# Patient Record
Sex: MALE | Race: WHITE | NOT HISPANIC OR LATINO | Employment: FULL TIME | URBAN - METROPOLITAN AREA
[De-identification: names, ages, dates, MRNs, and addresses within clinical notes are randomized per-mention and may not be internally consistent; named-entity substitution may affect disease eponyms.]

---

## 2017-10-12 ENCOUNTER — APPOINTMENT (OUTPATIENT)
Dept: GENERAL RADIOLOGY | Facility: HOSPITAL | Age: 22
End: 2017-10-12

## 2017-10-12 ENCOUNTER — HOSPITAL ENCOUNTER (EMERGENCY)
Facility: HOSPITAL | Age: 22
Discharge: HOME | End: 2017-10-12
Attending: EMERGENCY MEDICINE | Admitting: EMERGENCY MEDICINE

## 2017-10-12 VITALS
HEART RATE: 64 BPM | OXYGEN SATURATION: 100 % | SYSTOLIC BLOOD PRESSURE: 127 MMHG | BODY MASS INDEX: 22.9 KG/M2 | WEIGHT: 160 LBS | TEMPERATURE: 98.4 F | DIASTOLIC BLOOD PRESSURE: 90 MMHG | RESPIRATION RATE: 24 BRPM | HEIGHT: 70 IN

## 2017-10-12 DIAGNOSIS — R07.89 ATYPICAL CHEST PAIN: Primary | ICD-10-CM

## 2017-10-12 LAB
ALBUMIN SERPL-MCNC: 4.7 G/DL (ref 3.5–5.2)
ALBUMIN/GLOB SERPL: 1.9 G/DL
ALP SERPL-CCNC: 54 U/L (ref 40–129)
ALT SERPL W P-5'-P-CCNC: 16 U/L (ref 5–41)
ANION GAP SERPL CALCULATED.3IONS-SCNC: 15.7 MMOL/L
AST SERPL-CCNC: 18 U/L (ref 5–40)
BASOPHILS # BLD AUTO: 0.07 10*3/MM3 (ref 0–0.2)
BASOPHILS NFR BLD AUTO: 0.6 % (ref 0–2)
BILIRUB SERPL-MCNC: 0.9 MG/DL (ref 0.2–1.2)
BUN BLD-MCNC: 9 MG/DL (ref 6–20)
BUN/CREAT SERPL: 8 (ref 7–25)
CALCIUM SPEC-SCNC: 10 MG/DL (ref 8.6–10.5)
CHLORIDE SERPL-SCNC: 99 MMOL/L (ref 98–107)
CO2 SERPL-SCNC: 23.3 MMOL/L (ref 22–29)
CREAT BLD-MCNC: 1.12 MG/DL (ref 0.76–1.27)
D DIMER PPP FEU-MCNC: <0.3 MCGFEU/ML (ref 0–0.46)
DEPRECATED RDW RBC AUTO: 38.9 FL (ref 37–54)
EOSINOPHIL # BLD AUTO: 0.11 10*3/MM3 (ref 0.1–0.3)
EOSINOPHIL NFR BLD AUTO: 1 % (ref 0–4)
ERYTHROCYTE [DISTWIDTH] IN BLOOD BY AUTOMATED COUNT: 12.5 % (ref 11.5–14.5)
GFR SERPL CREATININE-BSD FRML MDRD: 81 ML/MIN/1.73
GLOBULIN UR ELPH-MCNC: 2.5 GM/DL
GLUCOSE BLD-MCNC: 104 MG/DL (ref 65–99)
HCT VFR BLD AUTO: 45.5 % (ref 42–52)
HGB BLD-MCNC: 16.3 G/DL (ref 14–18)
IMM GRANULOCYTES # BLD: 0.05 10*3/MM3 (ref 0–0.03)
IMM GRANULOCYTES NFR BLD: 0.4 % (ref 0–0.5)
LIPASE SERPL-CCNC: 14 U/L (ref 13–60)
LYMPHOCYTES # BLD AUTO: 2.24 10*3/MM3 (ref 0.6–4.8)
LYMPHOCYTES NFR BLD AUTO: 19.9 % (ref 20–45)
MCH RBC QN AUTO: 30.7 PG (ref 27–31)
MCHC RBC AUTO-ENTMCNC: 35.8 G/DL (ref 31–37)
MCV RBC AUTO: 85.7 FL (ref 80–94)
MONOCYTES # BLD AUTO: 0.81 10*3/MM3 (ref 0–1)
MONOCYTES NFR BLD AUTO: 7.2 % (ref 3–8)
NEUTROPHILS # BLD AUTO: 7.95 10*3/MM3 (ref 1.5–8.3)
NEUTROPHILS NFR BLD AUTO: 70.9 % (ref 45–70)
NRBC BLD MANUAL-RTO: 0 /100 WBC (ref 0–0)
PLATELET # BLD AUTO: 269 10*3/MM3 (ref 140–500)
PMV BLD AUTO: 11.7 FL (ref 7.4–10.4)
POTASSIUM BLD-SCNC: 3.8 MMOL/L (ref 3.5–5.2)
PROT SERPL-MCNC: 7.2 G/DL (ref 6–8.5)
RBC # BLD AUTO: 5.31 10*6/MM3 (ref 4.7–6.1)
SODIUM BLD-SCNC: 138 MMOL/L (ref 136–145)
TROPONIN T SERPL-MCNC: <0.01 NG/ML (ref 0–0.03)
WBC NRBC COR # BLD: 11.23 10*3/MM3 (ref 4.8–10.8)

## 2017-10-12 PROCEDURE — 93005 ELECTROCARDIOGRAM TRACING: CPT | Performed by: EMERGENCY MEDICINE

## 2017-10-12 PROCEDURE — 99284 EMERGENCY DEPT VISIT MOD MDM: CPT | Performed by: EMERGENCY MEDICINE

## 2017-10-12 PROCEDURE — 93010 ELECTROCARDIOGRAM REPORT: CPT | Performed by: INTERNAL MEDICINE

## 2017-10-12 PROCEDURE — 80053 COMPREHEN METABOLIC PANEL: CPT | Performed by: EMERGENCY MEDICINE

## 2017-10-12 PROCEDURE — 83690 ASSAY OF LIPASE: CPT | Performed by: EMERGENCY MEDICINE

## 2017-10-12 PROCEDURE — 85025 COMPLETE CBC W/AUTO DIFF WBC: CPT | Performed by: EMERGENCY MEDICINE

## 2017-10-12 PROCEDURE — 84484 ASSAY OF TROPONIN QUANT: CPT | Performed by: EMERGENCY MEDICINE

## 2017-10-12 PROCEDURE — 71010 HC CHEST PA OR AP: CPT

## 2017-10-12 PROCEDURE — 99284 EMERGENCY DEPT VISIT MOD MDM: CPT

## 2017-10-12 PROCEDURE — 85379 FIBRIN DEGRADATION QUANT: CPT | Performed by: EMERGENCY MEDICINE

## 2017-10-12 NOTE — ED PROVIDER NOTES
"Subjective     History provided by:  Patient    History of Present Illness    · Chief complaint: Chest pain    · Location: Left chest just below the nipple that does not radiate    · Quality/Severity: Pain is described as burning and comes and goes.    · Timing/Onset: The pain started 2 weeks ago and is been intermittent    · Modifying Factors: Taking a deep breath will exacerbate the pain sometimes    · Associated symptoms: He reports being a little short of breath, having nausea and vomiting today,  episodes of passing out, and cold sweats.    · Narrative: The patient is a 22-year-old white male complaining of a two-week history of intermittent left lower chest pain that does not radiate.  He reports associated nausea and vomiting today.  Today's episode is been present for 2 hours.  The patient wonders if is due to \"stress\".  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  Does not use drugs or alcohol, does drink energy drinks\".  He does not know his father.    ED Triage Vitals   Temp Heart Rate Resp BP SpO2   10/12/17 1218 10/12/17 1218 10/12/17 1218 10/12/17 1218 10/12/17 1218   97.9 °F (36.6 °C) 84 22 114/73 99 %      Temp src Heart Rate Source Patient Position BP Location FiO2 (%)   10/12/17 1218 10/12/17 1218 10/12/17 1218 10/12/17 1218 --   Oral Monitor Lying Left arm        Review of Systems   Constitutional: Positive for diaphoresis. Negative for activity change, appetite change, chills, fatigue and fever.   HENT: Negative for congestion, dental problem, ear pain, hearing loss, mouth sores, postnasal drip, rhinorrhea, sinus pressure, sore throat and voice change.    Eyes: Negative for photophobia, pain, discharge, redness and visual disturbance.   Respiratory: " Positive for shortness of breath. Negative for cough, chest tightness, wheezing and stridor.    Cardiovascular: Positive for chest pain. Negative for palpitations and leg swelling.   Gastrointestinal: Positive for nausea and vomiting. Negative for abdominal pain and diarrhea.   Genitourinary: Negative for difficulty urinating, dysuria, flank pain, frequency, hematuria and urgency.   Musculoskeletal: Negative for arthralgias, back pain, gait problem, joint swelling, myalgias, neck pain and neck stiffness.   Skin: Negative for color change and rash.   Neurological: Positive for syncope. Negative for dizziness, tremors, seizures, facial asymmetry, speech difficulty, weakness, light-headedness, numbness and headaches.   Hematological: Negative for adenopathy.   Psychiatric/Behavioral: Negative for confusion and decreased concentration. The patient is nervous/anxious.        Past Medical History:   Diagnosis Date   • Anxiety    • Partial hearing loss, right        No Known Allergies    Past Surgical History:   Procedure Laterality Date   • EAR CANALOPLASTY     • TONSILLECTOMY AND ADENOIDECTOMY         Family History   Problem Relation Age of Onset   • Irritable bowel syndrome Mother    • Cirrhosis Father        Social History     Social History   • Marital status: Single     Spouse name: N/A   • Number of children: N/A   • Years of education: N/A     Social History Main Topics   • Smoking status: Heavy Tobacco Smoker     Packs/day: 1.50     Types: Cigarettes   • Smokeless tobacco: None   • Alcohol use No   • Drug use: No   • Sexual activity: Defer      Comment: EXERCISE - RARELY     Other Topics Concern   • None     Social History Narrative   • None           Objective   Physical Exam   Constitutional: He is oriented to person, place, and time. He appears well-developed and well-nourished. No distress.   HENT:   Head: Normocephalic and atraumatic.   Nose: Nose normal.   Mouth/Throat: Oropharynx is clear and moist. No  oropharyngeal exudate.   Eyes: EOM are normal. Pupils are equal, round, and reactive to light. Right eye exhibits no discharge. Left eye exhibits no discharge. No scleral icterus.   Neck: Normal range of motion. Neck supple. No JVD present. No thyromegaly present.   Cardiovascular: Normal rate, regular rhythm and normal heart sounds.    No murmur heard.  Pulmonary/Chest: Effort normal and breath sounds normal. He has no wheezes. He has no rales. He exhibits no tenderness.   Pressure applied to the patient's left chest wall reproduces the chest pain.   Abdominal: Soft. Bowel sounds are normal. He exhibits no distension. There is no tenderness.   Musculoskeletal: Normal range of motion. He exhibits no edema, tenderness or deformity.   Lymphadenopathy:     He has no cervical adenopathy.   Neurological: He is alert and oriented to person, place, and time. No cranial nerve deficit. Coordination normal.   No focal motor sensory deficit   Skin: Skin is warm and dry. No rash noted. He is not diaphoretic.   Psychiatric: He has a normal mood and affect. His behavior is normal. Judgment and thought content normal.   Nursing note and vitals reviewed.      ECG 12 Lead    Date/Time: 10/12/2017 12:23 PM  Performed by: JEANCARLOS WOLF.  Authorized by: JEANCARLOS WOLF   Comments: EKG performed 12:23, EKG read 12/20/25.  Normal sinus rhythm rate 73, normal axis, normal R-wave progression, normal conduction, no acute ST segment elevation or depression consistent with ischemia, no ectopy, normal CT and QT intervals, normal EKG               ED Course  ED Course   Comment By Time   Review the patient's laboratory studies reveal a normal troponin, normal d-dimer, normal CMP, and slightly elevated white count of 11.2 with 71 segs count.  His chest sensory revealed no cardiopulmonary abnormality.  His EKG was normal.  Is my impression is chest pain is atypical and noncardiac in etiology.  I counseled the patient to stop smoking. Jeancarlos SHAHID  MD Aleksandr 10/12 1412                  [facility]  Number of Diagnoses or Management Options  Atypical chest pain: new and requires workup     Amount and/or Complexity of Data Reviewed  Clinical lab tests: ordered and reviewed  Tests in the radiology section of CPT®: ordered and reviewed  Tests in the medicine section of CPT®: ordered and reviewed  Independent visualization of images, tracings, or specimens: yes    Risk of Complications, Morbidity, and/or Mortality  Presenting problems: high  Diagnostic procedures: high  Management options: high  General comments: My differential diagnosis for chest pain includes but is not limited to:  Muscle strain, costochondritis, myositis, pleurisy, rib fracture, intercostal neuritis, herpes zoster, tumor, myocardial infarction, coronary syndrome, unstable angina, angina, aortic dissection, mitral valve prolapse, pericarditis, palpitations, pulmonary embolus, pneumonia, pneumothorax, lung cancer, GERD, esophagitis, esophageal spasm    Patient Progress  Patient progress: stable      Final diagnoses:   Atypical chest pain           Labs Reviewed   COMPREHENSIVE METABOLIC PANEL - Abnormal; Notable for the following:        Result Value    Glucose 104 (*)     All other components within normal limits   CBC WITH AUTO DIFFERENTIAL - Abnormal; Notable for the following:     WBC 11.23 (*)     MPV 11.7 (*)     Neutrophil % 70.9 (*)     Lymphocyte % 19.9 (*)     Immature Grans, Absolute 0.05 (*)     All other components within normal limits   TROPONIN (IN-HOUSE) - Normal    Narrative:     Troponin T Reference Ranges:  Less than 0.03 ng/mL:    Negative for AMI  0.03 to 0.09 ng/mL:      Indeterminant for AMI  Greater than 0.09 ng/mL: Positive for AMI   LIPASE - Normal   D-DIMER, QUANTITATIVE - Normal    Narrative:     Can be elevated in, but is not diagnostic for deep vein thrombosis (DVT) or pulmonary embolis (PE).  It is also elevated in other medical conditions.  Clinical correlation is  required.  The negative cut-off value for the D-Dimer is 0.50 mcg FEU/mL for DVT and PE.   CBC AND DIFFERENTIAL    Narrative:     The following orders were created for panel order CBC & Differential.  Procedure                               Abnormality         Status                     ---------                               -----------         ------                     CBC Auto Differential[[id number]]        Abnormal            Final result                 Please view results for these tests on the individual orders.     XR Chest 1 View   ED Interpretation   Normal lung fields, normal cardiac silhouette, normal mediastinum      Final Result   Negative chest.       This report was finalized on 10/12/2017 1:44 PM by Dr. Filipe Schilling MD.                 Medication List      Notice     No changes were made to your prescriptions during this visit.             Jeancarlos Brandon MD  10/12/17 [id number]

## 2022-01-19 ENCOUNTER — OFFICE VISIT (OUTPATIENT)
Dept: INFECTIOUS DISEASES | Facility: CLINIC | Age: 27
End: 2022-01-19

## 2022-01-19 VITALS
BODY MASS INDEX: 28.06 KG/M2 | HEIGHT: 70 IN | SYSTOLIC BLOOD PRESSURE: 121 MMHG | WEIGHT: 196 LBS | TEMPERATURE: 97.7 F | DIASTOLIC BLOOD PRESSURE: 81 MMHG | HEART RATE: 83 BPM | RESPIRATION RATE: 14 BRPM

## 2022-01-19 DIAGNOSIS — A49.02 MRSA INFECTION: ICD-10-CM

## 2022-01-19 DIAGNOSIS — L02.91 CUTANEOUS ABSCESS, UNSPECIFIED SITE: Primary | ICD-10-CM

## 2022-01-19 PROCEDURE — 99204 OFFICE O/P NEW MOD 45 MIN: CPT | Performed by: INTERNAL MEDICINE

## 2022-01-19 RX ORDER — MUPIROCIN CALCIUM 20 MG/G
1 CREAM TOPICAL 2 TIMES DAILY
Qty: 15 G | Refills: 0 | Status: SHIPPED | OUTPATIENT
Start: 2022-01-19 | End: 2022-01-26

## 2022-01-19 RX ORDER — MUPIROCIN CALCIUM 20 MG/G
1 CREAM TOPICAL 3 TIMES DAILY
COMMUNITY
End: 2022-01-19

## 2022-01-19 NOTE — PROGRESS NOTES
Referring Provider: Jesika Mireles PA-C  [address]  [place] [address]  Reason for clinic visits: Initial infectious disease clinic visit with concerns for MRSA skin infection    HPI: Cheikh Tang is a 26 y.o. male who presents to the infectious disease clinic with above complaints.  Patient was seen in May 2021 in urgent care with complaints of left buttock boil.  Not appear any drainage was needed and he was discharged on clindamycin 300 mg p.o. 3 times a day x10 days.  Despite taking the antibiotic the lesion worsened and he presented to his primary care provider at which time the lesion was drained and he was switched to doxycycline.  He was diagnosed with an inflamed epidermal inclusion cyst.  Over the course of the next month he continued to require repeat drainage.  When he was seen in clinic on June 22 he was prescribed Bactrim double strength 1 tab p.o. twice daily x7 days, mupirocin intranasal ointment twice a day for 1 week and bleach baths.  Despite this he developed another lesion on his right buttock and presented to an emergency room in July.  Lesion was drained and per the medical record grew MRSA.  He was prescribed Bactrim.  He states it has been 3 to 4 weeks since his last lesion.  He reports some lesions on his buttocks currently but these are not consistent with boils.  He denies any history of boils prior to May 2021.  He lives with his wife and has 4 birds.  He works as a .  He denies any abdominal pain nausea vomiting or diarrhea.  No shortness of breath or cough    Past Medical History:   Diagnosis Date   • Anxiety    • Partial hearing loss, right        Past Surgical History:   Procedure Laterality Date   • EAR CANALOPLASTY     • TONSILLECTOMY AND ADENOIDECTOMY         Social History   reports that he has been smoking cigarettes. He has been smoking about 0.50 packs per day. He uses smokeless tobacco. He reports that he does not drink alcohol  and does not use drugs.    Family History  family history includes Cirrhosis in his father; Irritable bowel syndrome in his mother.    No Known Allergies    The medication list has been reviewed and updated.     Review of Systems  Pertinent items are noted in HPI, all other systems reviewed and negative    Vital Signs   Vitals:    01/19/22 1000   BP: 121/81   Pulse: 83   Resp: 14   Temp: 97.7 °F (36.5 °C)     Physical Exam:   General: In no acute distress  HEENT: Normocephalic, atraumatic, no scleral icterus.   Neck: Supple, trachea is midline  Cardiovascular: Normal rate, regular rhythm, falguni S1 and S2, no murmurs, rubs, or gallops    Respiratory: Breathing comfortably on room air   GI: Abdomen is soft, non-tender, non-distended, positive bowel sounds bilaterally  Musculoskeletal:  no edema, tenderness or deformity  Skin: No rashes small erythematous lesion on left buttock  LE: no E/C/C  Neurological: Alert and oriented, moving all 4 extremities  Psychiatric: Normal mood and affect     Lab Results   Component Value Date    WBC 11.23 (H) 10/12/2017    HGB 16.3 10/12/2017    HCT 45.5 10/12/2017    MCV 85.7 10/12/2017     10/12/2017       Lab Results   Component Value Date    GLUCOSE 104 (H) 10/12/2017    BUN 9 10/12/2017    CREATININE 1.12 10/12/2017    EGFRIFNONA 81 10/12/2017    BCR 8.0 10/12/2017    CO2 23.3 10/12/2017    CALCIUM 10.0 10/12/2017    ALBUMIN 4.70 10/12/2017    AST 18 10/12/2017    ALT 16 10/12/2017       5/27/21 Wound cx MRSA (sensitive bactrim, vanc; resistant augmentin, clinda, erythro, tetracycline, cefazolin)    Assessment:  This is a 26 y.o. 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  Currently the patient does not have any physical findings consistent with a skin abscess.  Have discussed with him extensively pathogenicity of recurrent MRSA skin abscesses.  I am not entirely sure that he does not have an underlying condition such as folliculitis  that is predisposing him to skin abscesses.  Encouraged him to continue to follow-up with dermatology.  In the meantime I reviewed with him our decolonization protocol and provided written protocol.  The patient will start the protocol in the near future and continue it for 3 months.  I asked him to call the infectious disease clinic with any questions or concerns.    Plan:   1.  MRSA skin decolonization protocol has been provided all questions answered    Return to Infectious Disease clinic as needed    Time: More than 50% of time spent in counseling and coordination of care:  Total face-to-face/floor time 45 min.  Time spent in counseling 45 min. Counseling included the following topics: See assessment

## 2022-01-20 ENCOUNTER — PATIENT ROUNDING (BHMG ONLY) (OUTPATIENT)
Dept: INFECTIOUS DISEASES | Facility: CLINIC | Age: 27
End: 2022-01-20

## 2022-01-20 NOTE — PROGRESS NOTES
January 20, 2022    Hello, may I speak with Cheikh Tang?    My name is Shilpa Yin RN    I am  with [facility]  [address] [address] [address] [phone].    Before we get started may I verify your date of birth? 1995    I am calling to officially welcome you to our practice and ask about your recent visit. Is this a good time to talk? yes    Tell me about your visit with us. What things went well?  Well, Dr. Ang really went over everything with me but I do have a few questions related to the soap/bleach baths. I was able to give him the protocol and he now understands everything. I assured him he could call our office if any more questions or concerns arise.       We're always looking for ways to make our patients' experiences even better. Do you have recommendations on ways we may improve?  no    Overall were you satisfied with your first visit to our practice? yes       I appreciate you taking the time to speak with me today. Is there anything else I can do for you? no      Thank you, and have a great day.

## 2022-01-29 ENCOUNTER — TELEMEDICINE (OUTPATIENT)
Dept: FAMILY MEDICINE CLINIC | Facility: TELEHEALTH | Age: 27
End: 2022-01-29

## 2022-01-29 DIAGNOSIS — L73.9 FOLLICULITIS: Primary | ICD-10-CM

## 2022-01-29 PROBLEM — H91.91 HEARING LOSS IN RIGHT EAR: Status: ACTIVE | Noted: 2019-10-29

## 2022-01-29 PROBLEM — Z86.14 HISTORY OF MRSA INFECTION: Status: ACTIVE | Noted: 2021-05-05

## 2022-01-29 PROCEDURE — 99213 OFFICE O/P EST LOW 20 MIN: CPT | Performed by: NURSE PRACTITIONER

## 2022-01-29 RX ORDER — SULFAMETHOXAZOLE AND TRIMETHOPRIM 800; 160 MG/1; MG/1
1 TABLET ORAL 2 TIMES DAILY
Qty: 20 TABLET | Refills: 0 | Status: SHIPPED | OUTPATIENT
Start: 2022-01-29 | End: 2022-02-08

## 2022-01-29 NOTE — PATIENT INSTRUCTIONS
Folliculitis    Folliculitis is inflammation of the hair follicles. Folliculitis most commonly occurs on the scalp, thighs, legs, back, and buttocks. However, it can occur anywhere on the body.  What are the causes?  This condition may be caused by:  · A bacterial infection (common).  · A fungal infection.  · A viral infection.  · Contact with certain chemicals, especially oils and tars.  · Shaving or waxing.  · Greasy ointments or creams applied to the skin.  Long-lasting folliculitis and folliculitis that keeps coming back may be caused by bacteria. This bacteria can live anywhere on your skin and is often found in the nostrils.  What increases the risk?  You are more likely to develop this condition if you have:  · A weakened immune system.  · Diabetes.  · Obesity.  What are the signs or symptoms?  Symptoms of this condition include:  · Redness.  · Soreness.  · Swelling.  · Itching.  · Small white or yellow, pus-filled, itchy spots (pustules) that appear over a reddened area. If there is an infection that goes deep into the follicle, these may develop into a boil (furuncle).  · A group of closely packed boils (carbuncle). These tend to form in hairy, sweaty areas of the body.  How is this diagnosed?  This condition is diagnosed with a skin exam. To find what is causing the condition, your health care provider may take a sample of one of the pustules or boils for testing in a lab.  How is this treated?  This condition may be treated by:  · Applying warm compresses to the affected areas.  · Taking an antibiotic medicine or applying an antibiotic medicine to the skin.  · Applying or bathing with an antiseptic solution.  · Taking an over-the-counter medicine to help with itching.  · Having a procedure to drain any pustules or boils. This may be done if a pustule or boil contains a lot of pus or fluid.  · Having laser hair removal. This may be done to treat long-lasting folliculitis.  Follow these instructions at  home:  Managing pain and swelling    · If directed, apply heat to the affected area as often as told by your health care provider. Use the heat source that your health care provider recommends, such as a moist heat pack or a heating pad.  ? Place a towel between your skin and the heat source.  ? Leave the heat on for 20-30 minutes.  ? Remove the heat if your skin turns bright red. This is especially important if you are unable to feel pain, heat, or cold. You may have a greater risk of getting burned.    General instructions  · If you were prescribed an antibiotic medicine, take it or apply it as told by your health care provider. Do not stop using the antibiotic even if your condition improves.  · Check the irritated area every day for signs of infection. Check for:  ? Redness, swelling, or pain.  ? Fluid or blood.  ? Warmth.  ? Pus or a bad smell.  · Do not shave irritated skin.  · Take over-the-counter and prescription medicines only as told by your health care provider.  · Keep all follow-up visits as told by your health care provider. This is important.  Get help right away if:  · You have more redness, swelling, or pain in the affected area.  · Red streaks are spreading from the affected area.  · You have a fever.  Summary  · Folliculitis is inflammation of the hair follicles. Folliculitis most commonly occurs on the scalp, thighs, legs, back, and buttocks.  · This condition may be treated by taking an antibiotic medicine or applying an antibiotic medicine to the skin, and applying or bathing with an antiseptic solution.  · If you were prescribed an antibiotic medicine, take it or apply it as told by your health care provider. Do not stop using the antibiotic even if your condition improves.  · Get help right away if you have new or worsening symptoms.  · Keep all follow-up visits as told by your health care provider. This is important.  This information is not intended to replace advice given to you by your  health care provider. Make sure you discuss any questions you have with your health care provider.  Document Revised: 07/27/2019 Document Reviewed: 07/27/2019  Elsevier Patient Education © 2021 [employer]

## 2022-01-29 NOTE — PROGRESS NOTES
Mode of Visit: Video  Location of patient: home  You have chosen to receive care through a telehealth visit.  The patient has signed the video visit consent form.  The visit included audio and video interaction. No technical issues occurred during this visit.     Chief Complaint  Cellulitis    Subjective          Cheikh Tang presents to [facility]  Skin abscess with history of MRSA several times over the last couple of years. He has done the hibiclens baths, mupirocin ointment  And cleocin swabs. This episode started a few days ago on his pubic area. It is the size of a marble, red and sore. Drained yellow drainage today. He wants to get on some Bactrim so that he does not have to go to Urgent Care and be exposed to illness.    Cellulitis  This is a new problem. The current episode started in the past 7 days. The problem has been gradually worsening. Pertinent negatives include no chills or fever.     Objective   Vital Signs:   There were no vitals taken for this visit.    Physical Exam   Constitutional: He appears well-developed and well-nourished. No distress.   Skin:   Deferred exam due to location of the follicular skin eruption.      Result Review :                 Assessment and Plan    Diagnoses and all orders for this visit:    1. Folliculitis (Primary)    Other orders  -     sulfamethoxazole-trimethoprim (Bactrim DS) 800-160 MG per tablet; Take 1 tablet by mouth 2 (Two) Times a Day for 10 days.  Dispense: 20 tablet; Refill: 0              I spent 20 minutes caring for Cheikh on this date of service. This time includes time spent by me in the following activities:preparing for the visit, obtaining and/or reviewing a separately obtained history, performing a medically appropriate examination and/or evaluation , counseling and educating the patient/family/caregiver, ordering medications, tests, or procedures, and documenting information in the medical record  Follow Up    Return if symptoms worsen or fail to improve.  Patient was given instructions and counseling regarding his condition or for health maintenance advice. Please see specific information pulled into the AVS if appropriate.

## 2022-04-15 ENCOUNTER — TELEMEDICINE (OUTPATIENT)
Dept: FAMILY MEDICINE CLINIC | Facility: TELEHEALTH | Age: 27
End: 2022-04-15

## 2022-04-15 DIAGNOSIS — L02.92 BOIL: Primary | ICD-10-CM

## 2022-04-15 PROCEDURE — 99213 OFFICE O/P EST LOW 20 MIN: CPT | Performed by: NURSE PRACTITIONER

## 2022-04-15 RX ORDER — IBUPROFEN 800 MG/1
800 TABLET ORAL EVERY 6 HOURS PRN
Qty: 60 TABLET | Refills: 0 | Status: SHIPPED | OUTPATIENT
Start: 2022-04-15 | End: 2022-05-15

## 2022-04-15 RX ORDER — SULFAMETHOXAZOLE AND TRIMETHOPRIM 800; 160 MG/1; MG/1
1 TABLET ORAL 2 TIMES DAILY
Qty: 14 TABLET | Refills: 0 | Status: SHIPPED | OUTPATIENT
Start: 2022-04-15 | End: 2022-04-22

## 2022-04-15 NOTE — PROGRESS NOTES
Mode of Visit: Video  Location of patient: home  You have chosen to receive care through a telehealth visit.  The patient has signed the video visit consent form.  The visit included audio and video interaction. No technical issues occurred during this visit.     Chief Complaint  skin infection  Video Visit Reason:   Free Text Description: History of mrsa i have a boil need bactrim antibiotic to cure been tested before goes away for few months and comes back  Subjective          Cheikh Tang presents to [facility]  He  has a past medical history of Anxiety and Partial hearing loss, right.  He does not have any pertinent problems on file.  Current Outpatient Medications   Medication Sig Dispense Refill   • benzoyl peroxide 10 % external wash      • clindamycin (CLEOCIN T) 1 % swab      • ibuprofen (ADVIL,MOTRIN) 800 MG tablet Take 1 tablet by mouth Every 6 (Six) Hours As Needed for Moderate Pain  for up to 30 days. 60 tablet 0   • mupirocin (BACTROBAN) 2 % ointment APPLY 1 APPLICATION TOPICALLY TO THE APPROPRIATE AREAS AS DIRECTED TWICE DAILY FOR 7 DAYS. APPLY TO BOTH NOSTRILS TWICE DAILY FOR 7 DAYS     • sulfamethoxazole-trimethoprim (Bactrim DS) 800-160 MG per tablet Take 1 tablet by mouth 2 (Two) Times a Day for 7 days. 14 tablet 0     No current facility-administered medications for this visit.     He has No Known Allergies.    Painful skin lesion at the top of the tailbone. History of MRSA and furuncles and folliculitis that are usually covered with bactrim. He started developing the lesion and it has not become pus filled but is tender and firm. He had been using Hibiclens wash per the infectious disease specialist that he saw last.     Objective   Vital Signs:   There were no vitals taken for this visit.    Physical Exam   Constitutional: He appears well-developed and well-nourished. No distress.   Pulmonary/Chest: Effort normal.  No respiratory distress.  Neurological: He is  alert.     Result Review :                 Assessment and Plan    Diagnoses and all orders for this visit:    1. Boil (Primary)  -     sulfamethoxazole-trimethoprim (Bactrim DS) 800-160 MG per tablet; Take 1 tablet by mouth 2 (Two) Times a Day for 7 days.  Dispense: 14 tablet; Refill: 0  -     ibuprofen (ADVIL,MOTRIN) 800 MG tablet; Take 1 tablet by mouth Every 6 (Six) Hours As Needed for Moderate Pain  for up to 30 days.  Dispense: 60 tablet; Refill: 0          Cheikh Vanegasglkamilla  reports that he has quit smoking. His smoking use included cigarettes. He smoked 0.50 packs per day. He has quit using smokeless tobacco..  I spent 20 minutes caring for Cheikh on this date of service. This time includes time spent by me in the following activities:preparing for the visit, obtaining and/or reviewing a separately obtained history, performing a medically appropriate examination and/or evaluation , counseling and educating the patient/family/caregiver, ordering medications, tests, or procedures, and documenting information in the medical record  Follow Up   Return if symptoms worsen or fail to improve.  Patient was given instructions and counseling regarding his condition or for health maintenance advice. Please see specific information pulled into the AVS if appropriate.

## 2022-04-19 ENCOUNTER — OFFICE VISIT (OUTPATIENT)
Dept: SURGERY | Facility: CLINIC | Age: 27
End: 2022-04-19

## 2022-04-19 ENCOUNTER — PATIENT ROUNDING (BHMG ONLY) (OUTPATIENT)
Dept: SURGERY | Facility: CLINIC | Age: 27
End: 2022-04-19

## 2022-04-19 VITALS
TEMPERATURE: 97.8 F | HEART RATE: 70 BPM | HEIGHT: 70 IN | WEIGHT: 184.7 LBS | SYSTOLIC BLOOD PRESSURE: 120 MMHG | OXYGEN SATURATION: 96 % | DIASTOLIC BLOOD PRESSURE: 70 MMHG | RESPIRATION RATE: 18 BRPM | BODY MASS INDEX: 26.44 KG/M2

## 2022-04-19 DIAGNOSIS — K64.5 HEMORRHOID THROMBOSIS: Primary | ICD-10-CM

## 2022-04-19 PROCEDURE — 99203 OFFICE O/P NEW LOW 30 MIN: CPT | Performed by: SURGERY

## 2022-04-19 RX ORDER — HYDROCORTISONE ACETATE 25 MG/1
25 SUPPOSITORY RECTAL 2 TIMES DAILY
Qty: 12 SUPPOSITORY | Refills: 12 | Status: SHIPPED | OUTPATIENT
Start: 2022-04-19 | End: 2023-04-19

## 2022-04-19 NOTE — PROGRESS NOTES
Chief Complaint   Patient presents with   • Hemorrhoids        Patient is a 26 y.o. male with a  times in English.  Patient started having problems with hemorrhoids about 5 years ago.  Patient mainly had itching and some bleeding intermittently.  However, approximately 2 weeks ago he had severe pain in 3 different areas that were hard.  2 of them resolved on their own and 1 was persistent and is still painful.  Patient has been using preparation H and sitz bath's with some relief.  Patient has never had a thrombosed hemorrhoid in the past.  Patient denies fever, chills, nausea or vomiting.    Past Medical History:   Diagnosis Date   • Anxiety    • Partial hearing loss, right      Past Surgical History:   Procedure Laterality Date   • EAR CANALOPLASTY     • TONSILLECTOMY AND ADENOIDECTOMY       Family History   Problem Relation Age of Onset   • Irritable bowel syndrome Mother    • Cirrhosis Father      Social History     Tobacco Use   • Smoking status: Former Smoker     Packs/day: 0.50     Types: Cigarettes   • Smokeless tobacco: Former User   Vaping Use   • Vaping Use: Former   • Substances: Nicotine   • Devices: Disposable   Substance Use Topics   • Alcohol use: No   • Drug use: No     No Known Allergies    Current Outpatient Medications:   •  benzoyl peroxide 10 % external wash, , Disp: , Rfl:   •  clindamycin (CLEOCIN T) 1 % swab, , Disp: , Rfl:   •  ibuprofen (ADVIL,MOTRIN) 800 MG tablet, Take 1 tablet by mouth Every 6 (Six) Hours As Needed for Moderate Pain  for up to 30 days., Disp: 60 tablet, Rfl: 0  •  mupirocin (BACTROBAN) 2 % ointment, APPLY 1 APPLICATION TOPICALLY TO THE APPROPRIATE AREAS AS DIRECTED TWICE DAILY FOR 7 DAYS. APPLY TO BOTH NOSTRILS TWICE DAILY FOR 7 DAYS, Disp: , Rfl:   •  sulfamethoxazole-trimethoprim (Bactrim DS) 800-160 MG per tablet, Take 1 tablet by mouth 2 (Two) Times a Day for 7 days., Disp: 14 tablet, Rfl: 0    Review of Systems  General: Patient reports during good  "health  Eyes: No eye problems  Ears, nose, mouth and throat: No rhinitis, no hearing problems, no chronic cough  Cardiovascular/heart: Denies palpitations, syncope or chest pain  Respiratory/lung: Denies shortness of breath, hemoptysis, dyspnea on exertion   Genital/urinary: No frequency, hematuria or dysuria  Hematological/lymphatic: Denies anemia or other problems  Musculoskeletal: No joint pain, no defects  Skin: No psoriasis or other skin issues  Neurological: No seizures or other neurological problems  Psychiatric: None  Endocrine: Negative  Gastro-intestinal: No constipation, no diarrhea, no melena, no hematochezia, see HPI  Vitals:    04/19/22 0831   BP: 120/70   BP Location: Left arm   Patient Position: Sitting   Cuff Size: Adult   Pulse: 70   Resp: 18   Temp: 97.8 °F (36.6 °C)   TempSrc: Temporal   SpO2: 96%   Weight: 83.8 kg (184 lb 11.2 oz)   Height: 177.8 cm (70\")       Physical Exam  General/physcological:   Alert and oriented x3, in no acute distress  HEENT: Normal cephalic, atraumatic, PERRLA, EOMI, sclera anicteric, moist mucous membranes, neck is supple, no JVD, no carotid bruits, no thyromegaly no adenopathy  Respiratory: Normal inspiratory effort  CVA: RRR, normal S1-S2, no murmurs, no gallops or rubs  Rectal: Patient has prolapsed type II external hemorrhoids x3 and 1 thrombosed hemorrhoid, no rectal masses  Musculoskeletal: Moves all 4 ext, no clubbing, no cyanosis or edema  Neurovascular: Grossly intact  Debilities: none  Emotional behavior: appropriate     Assessment:  Thrombosed hemorrhoid  Plan:  I have discussed incising and removing the clot.  I have also discussed hemorrhoidectomies versus conservative treatment.  Patient does have to work today and does not want drainage of the clot.  Therefore I have prescribed Anusol HC suppositories.    Dedra Redding MD  General, Minimally Invasive and Endoscopic Surgery  [facility]      [address] [place] [address] "   [address]    [address]  [place] [address]               [place] [address]    P: [phone]  F: [phone]    Cc:  Provider, No Known

## 2022-04-19 NOTE — PROGRESS NOTES
April 19, 2022    Hello, may I speak with Cheikh Tang?    My name is ESTELA TIJERINA    I am  with [facility]  [facility]  [address] [address] [phone].    Before we get started may I verify your date of birth? 1995    I am calling to officially welcome you to our practice and ask about your recent visit. Is this a good time to talk? yes    Tell me about your visit with us. What things went well?  WENT WELL VERY NICE DOCTOR REALLY LIKE HER AND EVERYTHING WAS EXPLAINED THOROUGHLY        We're always looking for ways to make our patients' experiences even better. Do you have recommendations on ways we may improve?  no    Overall were you satisfied with your first visit to our practice? yes       I appreciate you taking the time to speak with me today. Is there anything else I can do for you? no      Thank you, and have a great day.

## 2023-05-28 ENCOUNTER — TELEMEDICINE (OUTPATIENT)
Dept: FAMILY MEDICINE CLINIC | Facility: TELEHEALTH | Age: 28
End: 2023-05-28
Payer: COMMERCIAL

## 2023-05-28 DIAGNOSIS — K64.9 HEMORRHOIDS, UNSPECIFIED HEMORRHOID TYPE: Primary | ICD-10-CM

## 2023-05-28 RX ORDER — HYDROCORTISONE ACETATE 25 MG/1
25 SUPPOSITORY RECTAL 2 TIMES DAILY
Qty: 12 SUPPOSITORY | Refills: 0 | Status: SHIPPED | OUTPATIENT
Start: 2023-05-28 | End: 2023-06-27

## 2023-05-28 NOTE — PROGRESS NOTES
Chief Complaint   Patient presents with    Hemorrhoids       Video Visit Reason:   Free Text Description: I have history of hemorrhoids but only need to speak to doctor so i can get prescription for hydrocortisone suppositories  Sheree Tang is a 28 y.o. male.     History of Present Illness  Hemorrhoids and has been seen for them in the past. He says that he has been told that they are not bad enough to require surgery. The hydrocortisone suppositories work the best and he is wanting to get a prescription, as he has tried the OTC medications and they have not helped. He has tried sitz baths, as well.  Hemorrhoids  This is a chronic problem. The current episode started in the past 7 days. The problem has been gradually worsening. Pertinent negatives include no abdominal pain, change in bowel habit, chills, fever, nausea, urinary symptoms or vomiting. Treatments tried: OTC cream. The treatment provided no relief.     The following portions of the patient's history were reviewed and updated as appropriate: allergies, current medications, past medical history, and problem list.      Past Medical History:   Diagnosis Date    Anxiety     Partial hearing loss, right      Social History     Socioeconomic History    Marital status:    Tobacco Use    Smoking status: Former     Packs/day: 0.50     Types: Cigarettes    Smokeless tobacco: Former   Vaping Use    Vaping Use: Every day    Substances: Nicotine    Devices: Disposable   Substance and Sexual Activity    Alcohol use: No    Drug use: No    Sexual activity: Defer     Comment: EXERCISE - RARELY     medication documentation: reviewed and updated with patient and   Current Outpatient Medications:     benzoyl peroxide 10 % external wash, , Disp: , Rfl:     clindamycin (CLEOCIN T) 1 % swab, , Disp: , Rfl:     hydrocortisone (ANUSOL-HC) 25 MG suppository, Insert 1 suppository into the rectum 2 (Two) Times a Day for 30 days., Disp: 12 suppository, Rfl:  0  Review of Systems   Constitutional:  Negative for chills and fever.   Gastrointestinal:  Positive for anal bleeding, hemorrhoids and rectal pain. Negative for abdominal distention, abdominal pain, blood in stool, change in bowel habit, constipation, nausea and vomiting.     Objective   Physical Exam  Constitutional:       General: He is not in acute distress.     Appearance: He is not ill-appearing.   Pulmonary:      Effort: Pulmonary effort is normal.   Neurological:      Mental Status: He is alert.   Psychiatric:         Speech: Speech normal.       Assessment & Plan   Diagnoses and all orders for this visit:    1. Hemorrhoids, unspecified hemorrhoid type (Primary)  -     hydrocortisone (ANUSOL-HC) 25 MG suppository; Insert 1 suppository into the rectum 2 (Two) Times a Day for 30 days.  Dispense: 12 suppository; Refill: 0                    Follow Up:  If your symptoms are not resolving by the completion of your treatment or are worsening, see your primary care provider for follow up. If you don't have a primary care provider, you may go to any Urgent Care for re-evaluation. If you develop any life threatening symptoms, go to the nearest Emergency Department immediately or call EMS.               The use of  Video Visit was utilized during this visit, using both [employer] and [employer]. The use of a video visit has been reviewed with the patient and verbal informed consent has been obtained. No technical difficulties occurred during the visit.    is located at [address] [place] [id number]  Provider is located at [place], [place]

## 2023-05-28 NOTE — PATIENT INSTRUCTIONS
Hemorrhoids    Hemorrhoids are swollen veins that may develop:  In the butt (rectum). These are called internal hemorrhoids.  Around the opening of the butt (anus). These are called external hemorrhoids.  Hemorrhoids can cause pain, itching, or bleeding. Most of the time, they do not cause serious problems. They usually get better with diet changes, lifestyle changes, and other home treatments.  What are the causes?  This condition may be caused by:  Having trouble pooping (constipation).  Pushing hard (straining) to poop.  Watery poop (diarrhea).  Pregnancy.  Being very overweight (obese).  Sitting for long periods of time.  Heavy lifting or other activity that causes you to strain.  Anal sex.  Riding a bike for a long period of time.  What are the signs or symptoms?  Symptoms of this condition include:  Pain.  Itching or soreness in the butt.  Bleeding from the butt.  Leaking poop.  Swelling in the area.  One or more lumps around the opening of your butt.  How is this diagnosed?  A doctor can often diagnose this condition by looking at the affected area. The doctor may also:  Do an exam that involves feeling the area with a gloved hand (digital rectal exam).  Examine the area inside your butt using a small tube (anoscope).  Order blood tests. This may be done if you have lost a lot of blood.  Have you get a test that involves looking inside the colon using a flexible tube with a camera on the end (sigmoidoscopy or colonoscopy).  How is this treated?  This condition can usually be treated at home. Your doctor may tell you to change what you eat, make lifestyle changes, or try home treatments. If these do not help, procedures can be done to remove the hemorrhoids or make them smaller. These may involve:  Placing rubber bands at the base of the hemorrhoids to cut off their blood supply.  Injecting medicine into the hemorrhoids to shrink them.  Shining a type of light energy onto the hemorrhoids to cause them to fall  off.  Doing surgery to remove the hemorrhoids or cut off their blood supply.  Follow these instructions at home:  Eating and drinking    Eat foods that have a lot of fiber in them. These include whole grains, beans, nuts, fruits, and vegetables.  Ask your doctor about taking products that have added fiber (fibersupplements).  Reduce the amount of fat in your diet. You can do this by:  Eating low-fat dairy products.  Eating less red meat.  Avoiding processed foods.  Drink enough fluid to keep your pee (urine) pale yellow.  Managing pain and swelling    Take a warm-water bath (sitz bath) for 20 minutes to ease pain. Do this 3-4 times a day. You may do this in a bathtub or using a portable sitz bath that fits over the toilet.  If told, put ice on the painful area. It may be helpful to use ice between your warm baths.  Put ice in a plastic bag.  Place a towel between your skin and the bag.  Leave the ice on for 20 minutes, 2-3 times a day.  General instructions  Take over-the-counter and prescription medicines only as told by your doctor.  Medicated creams and medicines may be used as told.  Exercise often. Ask your doctor how much and what kind of exercise is best for you.  Go to the bathroom when you have the urge to poop. Do not wait.  Avoid pushing too hard when you poop.  Keep your butt dry and clean. Use wet toilet paper or moist towelettes after pooping.  Do not sit on the toilet for a long time.  Keep all follow-up visits as told by your doctor. This is important.  Contact a doctor if you:  Have pain and swelling that do not get better with treatment or medicine.  Have trouble pooping.  Cannot poop.  Have pain or swelling outside the area of the hemorrhoids.  Get help right away if you have:  Bleeding that will not stop.  Summary  Hemorrhoids are swollen veins in the butt or around the opening of the butt.  They can cause pain, itching, or bleeding.  Eat foods that have a lot of fiber in them. These include  whole grains, beans, nuts, fruits, and vegetables.  Take a warm-water bath (sitz bath) for 20 minutes to ease pain. Do this 3-4 times a day.  This information is not intended to replace advice given to you by your health care provider. Make sure you discuss any questions you have with your health care provider.  Document Revised: 06/29/2022 Document Reviewed: 06/29/2022  Elsevier Patient Education © 2022 [employer]

## 2023-10-08 ENCOUNTER — TELEMEDICINE (OUTPATIENT)
Dept: FAMILY MEDICINE CLINIC | Facility: TELEHEALTH | Age: 28
End: 2023-10-08
Payer: COMMERCIAL

## 2023-10-08 DIAGNOSIS — K64.9 HEMORRHOIDS, UNSPECIFIED HEMORRHOID TYPE: Primary | ICD-10-CM

## 2023-10-08 RX ORDER — HYDROCORTISONE ACETATE 25 MG/1
25 SUPPOSITORY RECTAL 2 TIMES DAILY PRN
Qty: 14 SUPPOSITORY | Refills: 1 | Status: SHIPPED | OUTPATIENT
Start: 2023-10-08

## 2023-10-08 NOTE — PATIENT INSTRUCTIONS
You may continue over the counter treatments if needed for symptom relief.  Warm sits baths may help with discomfort.  Recommend increasing water intake and/or stool softeners as needed to prevent hard stools or constipation.    If symptoms worsen or do not improve follow up with your PCP or visit your nearest Urgent Care Center or ER.

## 2023-10-08 NOTE — PROGRESS NOTES
Subjective   Chief Complaint   Patient presents with    Hemorrhoids       Cheikh Tang is a 28 y.o. male.     History of Present Illness  Patient reports a hemorrhoid flare that started a few days ago.  He states symptoms began after he passed a hard stool.  He is experiencing itching, aching and just generalized discomfort.  He has been using over-the-counter hydrocortisone cream but thinks the hemorrhoid is internal so this treatment has not helped very much.  He has been seen for this in the past and was prescribed hydrocortisone suppositories which worked well.  He currently does not have a PCP since his last one retired.  Hemorrhoids  This is a recurrent problem. Episode onset: few days. The problem occurs intermittently. The problem has been waxing and waning. Associated symptoms include a change in bowel habit. Pertinent negatives include no abdominal pain, anorexia, arthralgias, chest pain, chills, congestion, coughing, diaphoresis, fatigue, fever, headaches, joint swelling, myalgias, nausea, neck pain, numbness, rash, sore throat, swollen glands, urinary symptoms, vertigo, visual change, vomiting or weakness. Exacerbated by: bowel movements. Treatments tried: hydrocortisone cream. The treatment provided no relief.        No Known Allergies    Past Medical History:   Diagnosis Date    Anxiety     Partial hearing loss, right        Past Surgical History:   Procedure Laterality Date    EAR CANALOPLASTY      TONSILLECTOMY AND ADENOIDECTOMY         Social History     Socioeconomic History    Marital status:    Tobacco Use    Smoking status: Former     Packs/day: .5     Types: Cigarettes    Smokeless tobacco: Former   Vaping Use    Vaping Use: Every day    Substances: Nicotine    Devices: Disposable   Substance and Sexual Activity    Alcohol use: No    Drug use: No    Sexual activity: Defer     Comment: EXERCISE - RARELY       Family History   Problem Relation Age of Onset    Irritable bowel syndrome  Mother     Cirrhosis Father          Current Outpatient Medications:     benzoyl peroxide 10 % external wash, , Disp: , Rfl:     clindamycin (CLEOCIN T) 1 % swab, , Disp: , Rfl:     hydrocortisone (ANUSOL-HC) 25 MG suppository, Insert 1 suppository into the rectum 2 (Two) Times a Day As Needed for Hemorrhoids., Disp: 14 suppository, Rfl: 1      Review of Systems   Constitutional:  Negative for chills, diaphoresis, fatigue and fever.   HENT:  Negative for congestion, sore throat and swollen glands.    Respiratory:  Negative for cough.    Cardiovascular:  Negative for chest pain.   Gastrointestinal:  Positive for change in bowel habit, hemorrhoids and rectal pain. Negative for abdominal distention, abdominal pain, anal bleeding, anorexia, blood in stool, constipation, diarrhea, nausea, vomiting, GERD and indigestion.   Musculoskeletal:  Negative for arthralgias, joint swelling, myalgias and neck pain.   Skin:  Negative for rash.   Neurological:  Negative for vertigo, weakness, numbness and headache.        There were no vitals filed for this visit.    Objective   Physical Exam  Constitutional:       General: He is not in acute distress.     Appearance: Normal appearance. He is not ill-appearing, toxic-appearing or diaphoretic.   HENT:      Head: Normocephalic.      Mouth/Throat:      Lips: Pink.      Mouth: Mucous membranes are moist.   Pulmonary:      Effort: Pulmonary effort is normal.   Abdominal:      Tenderness: There is no abdominal tenderness (per pt).   Neurological:      Mental Status: He is alert and oriented to person, place, and time.          Procedures     Assessment & Plan   Diagnoses and all orders for this visit:    1. Hemorrhoids, unspecified hemorrhoid type (Primary)  -     hydrocortisone (ANUSOL-HC) 25 MG suppository; Insert 1 suppository into the rectum 2 (Two) Times a Day As Needed for Hemorrhoids.  Dispense: 14 suppository; Refill: 1      You may continue over the counter treatments if needed  for symptom relief.  Warm sits baths may help with discomfort.  Recommend increasing water intake and/or stool softeners as needed to prevent hard stools or constipation.    If symptoms worsen or do not improve follow up with your PCP or visit your nearest Urgent Care Center or ER.      PLAN: Offered to make PCP referral.  Patient declined at this time.  Discussed dosing, side effects, recommended other symptomatic care.  Patient should follow up with primary care provider, Urgent Care or ER if symptoms worsen, fail to resolve or other symptoms need attention. Patient/family agree to the above.         MEGGAN Meza     The use of a video visit has been reviewed with the patient and verbal informed consent has been obtained. Myself and hCeikh Tang participated in this visit. The patient is located at [address] [place] [address]. I am located in [place], [place]. Mychart and Zoom were utilized.        This visit was performed via Telehealth.  This patient has been instructed to follow-up with their primary care provider if their symptoms worsen or the treatment provided does not resolve their illness.